# Patient Record
Sex: FEMALE | Race: OTHER | HISPANIC OR LATINO | URBAN - METROPOLITAN AREA
[De-identification: names, ages, dates, MRNs, and addresses within clinical notes are randomized per-mention and may not be internally consistent; named-entity substitution may affect disease eponyms.]

---

## 2018-08-03 ENCOUNTER — OUTPATIENT (OUTPATIENT)
Dept: OUTPATIENT SERVICES | Facility: HOSPITAL | Age: 63
LOS: 1 days | Discharge: HOME | End: 2018-08-03
Payer: COMMERCIAL

## 2018-08-03 ENCOUNTER — RESULT REVIEW (OUTPATIENT)
Age: 63
End: 2018-08-03

## 2018-08-03 PROCEDURE — 73630 X-RAY EXAM OF FOOT: CPT | Mod: 26,LT

## 2019-03-19 PROBLEM — Z00.00 ENCOUNTER FOR PREVENTIVE HEALTH EXAMINATION: Status: ACTIVE | Noted: 2019-03-19

## 2019-04-15 ENCOUNTER — APPOINTMENT (OUTPATIENT)
Dept: PULMONOLOGY | Facility: CLINIC | Age: 64
End: 2019-04-15

## 2019-05-06 ENCOUNTER — APPOINTMENT (OUTPATIENT)
Dept: PULMONOLOGY | Facility: CLINIC | Age: 64
End: 2019-05-06
Payer: COMMERCIAL

## 2019-05-06 VITALS
SYSTOLIC BLOOD PRESSURE: 108 MMHG | WEIGHT: 130 LBS | DIASTOLIC BLOOD PRESSURE: 70 MMHG | TEMPERATURE: 97.8 F | BODY MASS INDEX: 22.2 KG/M2 | HEIGHT: 64 IN | HEART RATE: 79 BPM | OXYGEN SATURATION: 98 %

## 2019-05-06 PROCEDURE — 94010 BREATHING CAPACITY TEST: CPT

## 2019-05-06 PROCEDURE — 99204 OFFICE O/P NEW MOD 45 MIN: CPT | Mod: 25

## 2019-05-06 NOTE — CONSULT LETTER
[Dear  ___] : Dear  [unfilled], [( Thank you for referring [unfilled] for consultation for _____ )] : Thank you for referring [unfilled] for consultation for [unfilled] [Please see my note below.] : Please see my note below. [Consult Closing:] : Thank you very much for allowing me to participate in the care of this patient.  If you have any questions, please do not hesitate to contact me. [Sincerely,] : Sincerely, [FreeTextEntry2] : Milan Nice, \par [address] \[place] [address] [FreeTextEntry3] : Dayna Cazares MD, FCCP\par

## 2019-05-06 NOTE — ASSESSMENT
[FreeTextEntry1] : Data reviewed:\par \par PA/lat CXR [facility] 7/19/18 personally reviewed: apical scarring, hyperinflation\par \par Nav 05/06/2019 : mild restriction\par \par Impression:\par Apical scarring on CXR\par \par Plan:\par There is no evidence of any lung disease. Apical scarring is a common benign finding. She has no airflow obstruction on spirometry. She can return for new symptoms or concerns.

## 2019-05-06 NOTE — HISTORY OF PRESENT ILLNESS
[FreeTextEntry1] : 05/06/2019: Asked to evaluate patient by Dr Nice for abnormal CXR. She also notes a history of bad cough 1-2x a year. 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 No dyspnea or chronic cough. No wheeze. No asthma but + allergies s/p immunotherapy. Born DR. No pneumonia or TB.\par

## 2019-05-06 NOTE — PHYSICAL EXAM
[General Appearance - Well Developed] : well developed [General Appearance - Well Nourished] : well nourished [General Appearance - In No Acute Distress] : no acute distress [Normal Conjunctiva] : the conjunctiva exhibited no abnormalities [Normal Oropharynx] : normal oropharynx [FreeTextEntry1] : no LAD [Heart Rate And Rhythm] : heart rate and rhythm were normal [Heart Sounds] : normal S1 and S2 [Edema] : no peripheral edema present [Murmurs] : no murmurs present [Auscultation Breath Sounds / Voice Sounds] : lungs were clear to auscultation bilaterally [Bowel Sounds] : normal bowel sounds [Abdomen Soft] : soft [Abdomen Tenderness] : non-tender [Nail Clubbing] : no clubbing of the fingernails [Cyanosis, Localized] : no localized cyanosis [] : no rash [Affect] : the affect was normal

## 2021-01-27 PROBLEM — M48.061 LUMBAR STENOSIS: Status: ACTIVE | Noted: 2021-01-27

## 2021-01-27 PROBLEM — Z82.5 FAMILY HISTORY OF ASTHMA: Status: ACTIVE | Noted: 2019-05-06

## 2021-01-27 PROBLEM — M81.0 OSTEOPOROSIS: Status: RESOLVED | Noted: 2021-01-27 | Resolved: 2021-01-27

## 2021-01-27 RX ORDER — ACETAMINOPHEN 325 MG/1
TABLET, FILM COATED ORAL
Refills: 0 | Status: ACTIVE | COMMUNITY

## 2021-01-28 ENCOUNTER — OUTPATIENT (OUTPATIENT)
Dept: OUTPATIENT SERVICES | Facility: HOSPITAL | Age: 66
LOS: 1 days | End: 2021-01-28
Payer: MEDICARE

## 2021-01-28 ENCOUNTER — RESULT REVIEW (OUTPATIENT)
Age: 66
End: 2021-01-28

## 2021-01-28 ENCOUNTER — APPOINTMENT (OUTPATIENT)
Dept: SPINE | Facility: CLINIC | Age: 66
End: 2021-01-28
Payer: MEDICARE

## 2021-01-28 VITALS
OXYGEN SATURATION: 95 % | WEIGHT: 129 LBS | DIASTOLIC BLOOD PRESSURE: 69 MMHG | SYSTOLIC BLOOD PRESSURE: 111 MMHG | TEMPERATURE: 97.7 F | BODY MASS INDEX: 22.02 KG/M2 | HEART RATE: 69 BPM | RESPIRATION RATE: 18 BRPM | HEIGHT: 64 IN

## 2021-01-28 DIAGNOSIS — M51.26 OTHER INTERVERTEBRAL DISC DISPLACEMENT, LUMBAR REGION: ICD-10-CM

## 2021-01-28 DIAGNOSIS — M81.0 AGE-RELATED OSTEOPOROSIS W/OUT CURRENT PATHOLOGICAL FRACTURE: ICD-10-CM

## 2021-01-28 DIAGNOSIS — Z82.5 FAMILY HISTORY OF ASTHMA AND OTHER CHRONIC LOWER RESPIRATORY DISEASES: ICD-10-CM

## 2021-01-28 DIAGNOSIS — M48.061 SPINAL STENOSIS, LUMBAR REGION WITHOUT NEUROGENIC CLAUDICATION: ICD-10-CM

## 2021-01-28 PROCEDURE — 99072 ADDL SUPL MATRL&STAF TM PHE: CPT

## 2021-01-28 PROCEDURE — 99205 OFFICE O/P NEW HI 60 MIN: CPT

## 2021-01-28 PROCEDURE — 72114 X-RAY EXAM L-S SPINE BENDING: CPT

## 2021-01-28 PROCEDURE — 72114 X-RAY EXAM L-S SPINE BENDING: CPT | Mod: 26

## 2021-01-28 NOTE — PHYSICAL EXAM
[General Appearance - Alert] : alert [General Appearance - In No Acute Distress] : in no acute distress [General Appearance - Well Nourished] : well nourished [General Appearance - Well-Appearing] : healthy appearing [Oriented To Time, Place, And Person] : oriented to person, place, and time [Impaired Insight] : insight and judgment were intact [Affect] : the affect was normal [Memory Recent] : recent memory was not impaired [Person] : oriented to person [Place] : oriented to place [Time] : oriented to time [Motor Tone] : muscle tone was normal in all four extremities [Motor Strength] : muscle strength was normal in all four extremities [5] : S1 toe walking 5/5 [Sensation Tactile Decrease] : light touch was intact [Abnormal Walk] : normal gait [Balance] : balance was intact [4+] : Patella right 4+ [3+] : Patella left 3+ [2+] : Ankle jerk left 2+ [Normal] : normal [Able to toe walk] : the patient was able to toe walk [Able to heel walk] : the patient was able to heel walk [Romberg's Sign] : Romberg's sign was negtive

## 2021-01-28 NOTE — END OF VISIT
[FreeTextEntry3] : The patient is a 65-year-old woman who reports intermittent on and off back pain for the last several months.  She says she is quite active gardening and also exercising on her elliptical machine that she has at home.  Her pain is localized in her upper back and radiates just to her flanks.  She has no radiation down into her legs although she says she has intermittent numbness in her middle toe on the right-hand side.  Her physical exam is unremarkable although she does have slightly hyperactive biceps and triceps reflexes and a slight Puckett's on the left but she has 9 - Romberg and she is able to do tandem gait without any difficulty.  Her lower extremities are full strength and she has no difficulty with inversion eversion or plantar flexion or dorsiflexion.  Her imaging reveals a acute disc herniation on her September 2020 MRI scan.  I explained her the natural history of disc disease and lumbar spondylosis.  I do not think that surgical intervention is warranted.  I explained to her the role of physical therapy and pain management as well as medications.  We will provide her with a referral for pain management as well as physical therapy.  I answered all of her questions to the best my ability.\par \par David Cole M.D., M.Sc.\par \par Department of Neurosurgery\par [facility]\par [facility]\par [facility]\par [place], [place]\par [email or web address] [phone] [Time Spent: ___ minutes] : I have spent [unfilled] minutes of time on the encounter.

## 2021-01-28 NOTE — HISTORY OF PRESENT ILLNESS
[de-identified] : Patient is a 66 yo F with PMH of osteoporosis presents for neurosurgical evaluation.\par She reports progressively worsening back pain over 5 years without significant trauma/injury. Pain is described as intermittent severe dull pain on her back sometimes radiating to LLE without numbness/paresthesia/weakness. Pain is worsening at night time and changing position from sitting to standing. She takes Tylenol and home exercise which minimally improve her pain. As per patient she can walk long distance without pain/difficulty and ambulates without assistive device. She denies balance problem, BB dysfunction, saddle anesthesia.\par MRI /Xray L-spine was completed by PCP and referred to neurosx for L3-5 DDD and stenosis.

## 2021-01-28 NOTE — ASSESSMENT
[FreeTextEntry1] : PLAN\par - endocrinology referral for osteoporosis management\par - PT/pain management

## 2021-01-28 NOTE — DATA REVIEWED
[de-identified] : L-spine wo on 9/17/2020 @[facility] showed L3-4 L paracentral herniated disc, L4-5 stenosis. [de-identified] : L-spine AP/Lat on 7/23/2020 @ [facility]\par L-spine 6 views today in PACS [de-identified] : DEXA on 1/21/2021 @ [facility]  showed T score -2.7, osteoporosis